# Patient Record
Sex: FEMALE | Race: BLACK OR AFRICAN AMERICAN | NOT HISPANIC OR LATINO
[De-identification: names, ages, dates, MRNs, and addresses within clinical notes are randomized per-mention and may not be internally consistent; named-entity substitution may affect disease eponyms.]

---

## 2020-03-16 ENCOUNTER — APPOINTMENT (OUTPATIENT)
Dept: ORTHOPEDIC SURGERY | Facility: CLINIC | Age: 40
End: 2020-03-16
Payer: COMMERCIAL

## 2020-03-16 VITALS
DIASTOLIC BLOOD PRESSURE: 85 MMHG | WEIGHT: 190 LBS | SYSTOLIC BLOOD PRESSURE: 123 MMHG | BODY MASS INDEX: 32.44 KG/M2 | HEART RATE: 85 BPM | HEIGHT: 64 IN

## 2020-03-16 DIAGNOSIS — Z60.2 PROBLEMS RELATED TO LIVING ALONE: ICD-10-CM

## 2020-03-16 DIAGNOSIS — Z78.9 OTHER SPECIFIED HEALTH STATUS: ICD-10-CM

## 2020-03-16 PROCEDURE — 73562 X-RAY EXAM OF KNEE 3: CPT | Mod: RT

## 2020-03-16 PROCEDURE — 99203 OFFICE O/P NEW LOW 30 MIN: CPT

## 2020-03-16 SDOH — SOCIAL STABILITY - SOCIAL INSECURITY: PROBLEMS RELATED TO LIVING ALONE: Z60.2

## 2020-03-16 NOTE — HISTORY OF PRESENT ILLNESS
[de-identified] : KYLE is a 39 year female who presents today with complaints of right knee pain.  patient states pain started started in Feb, no recalled injury. patient states 20 years ago had ACL reconstruction.  patient states positive swelling and stiffness to knee. patient states was seen at [facility] and colin had MRI, told to go to PT.  patient went to 2 sessions of PT so to this point. patient not taking any medication for pain

## 2020-03-16 NOTE — REVIEW OF SYSTEMS
[Joint Pain] : joint pain [Joint Stiffness] : joint stiffness [Joint Swelling] : joint swelling [FreeTextEntry9] : Right knee

## 2020-03-16 NOTE — DISCUSSION/SUMMARY
[de-identified] : Chelsy is a 39-year-old female status post ACL reconstruction 20 years ago.  Overall she is doing very well and is no instability of her knee her MRI shows no laxity of the ACL as well.  Her pain is secondary to patellofemoral pain syndrome IT band tendinitis and Pez anserine situs.  I have explained to the patient the anatomy of the patellofemoral joint. It includes the extensor mechanism (quadriceps tendon, quadriceps muscles, patella, patella tendon, and medial and lateral retinaculum). I have shown the patient that the articular cartilage gets a little softened. This is what is known as chondromalacia. If one theoretically were to remove or scrape all the articular cartilage, it would be identified as arthritis. Somewhat paradoxically, however, chondromalacia does not necessarily lead to arthritis or at least there is no evidence irrefutable at this point in time.\par \par Chondromalacia or anterior knee pain is a syndrome that hurts the patients more than it causes destruction to the knee; thus pain is not associated with destruction. Likewise, noises, cracking, and popping for the most part are not anymore significant than people cracking their knuckles. It is certainly not a sign of damage to the joint.\par Over 90% of the people with patellofemoral problems will get better if they understand the weight bearing stresses that are applied to the patellofemoral joint. The forces can range from 2 to 9 times your body weight per step and with abnormal alignment the average is usually higher. \par The treatment is to minimize weight-bearing stress and to strengthen the surrounding muscles. From a practical point of view, one can divide their lifestyle into activities of daily living, conditioning, and recreation. In activities of daily living one should feel free to walk around as necessary but only for functioning. If one has an option between stairs and an escalator, the latter is preferable.\par \par The conditioning aspect should be a non weight bearing program which is best achieved by bicycle riding at least 3 to 4 days a week. It should be done with increasing resistance for at least a half hour. The seat of the bike should always be kept at a position so that the knee stays within a 0 to 90 degree arc. This will avoid hyperextension and flexion beyond 90 degrees, which tends to aggravate symptoms of discomfort. Leg extension for stretching exercises are similarly kept within this arc of motion. Step machines are not advised as they tend to aggravate patellofemoral symptoms as do squats. A [employer] machine is an alternative that is usually acceptable.\par The recreational part of their life is based on the fact that since pain is not leading to destruction, we will compromise and allow a recreational lifestyle. If indeed activity, albeit associated with impact does not yield any symptoms, they should feel free to participate. If an increase in activities is associated with increasing discomfort, the patient should use "common sense" and cut back on the level of activity. Recreation, however, is never to be used for conditioning even in an asymptomatic patient. The patient must always maintain a conditioning program. I think the patient understands this explanation.\par \par While over 90% of the people with this syndrome will do well non-operatively, some conscientious patients will still have symptoms. If so, they should be reevaluated to ascertain if they fall into a small category of people who require physical therapy or surgery\par \par This time she elects for physical therapy and oral anti-inflammatories.  Meloxicam once daily for 21 days was given to her as well as a prescription for physical therapy.  I will see her back in 6 to 8 weeks for clinical reassessment.  She agrees with the above plan and all questions were answered.

## 2020-03-16 NOTE — PHYSICAL EXAM
[de-identified] : Physical Exam:\par General: Well appearing, no acute distress, A&O\par Neurologic: A&Ox3, No focal deficits\par Head: NCAT without abrasions, lacerations, or ecchymosis to head, face, or scalp\par Eyes: No scleral icterus, no gross abnormalities\par Respiratory: Equal chest wall expansion bilaterally, no accessory muscle use\par Lymphatic: No lymphadenopathy palpated\par Skin: Warm and dry\par Psychiatric: Normal mood and affect\par \par Right knee\par \par Inspection/Palpation: Gait evaluation does reveal a limp. There is no inguinal adenopathy. Limb is well-perfused, without skin lesions, shows a grossly normal motor and sensory examination.  Knee.  Positive grind test.  Negative patellar apprehension.  Positive patellar crepitus.\par ROM: The Right knee motion is significantly reduced and does cause significant pain. The knee exhibits a moderate effusion. \par Muscle/Nerves: Quad strength decreased secondary to injury. Motor exam 5/ distally, EHL/FHL/GSC/TA\par SILT L4-S1\par Special Tests: \par No joint line tenderness noted [medial/lateral] joint line at 0 and 90 degrees. \par Negative Michelle test.  Negative Appley grind test\par Stable in varus and valgus stress at 0 and 30 degrees\par Negative posterior drawer. \par The knee has a negative Lachman and negative anterior drawer.\par Normal hip and ankle exam. \par \par Left Knee\par \par Inspection/Palpation: There is no inguinal adenopathy. Well perfused, without skin lesions, shows a grossly normal motor and sensory examination. \par ROM: Normal\par Muscle/Nerves: Quad strength decreased secondary to injury. Motor exam 5/ distally, EHL/FHL/GSC/TA\par SILT L4-S1\par Special Tests: \par No Joint line tenderness noted  at medial and lateral joint line at 0 and 90 degrees. \par Stable in varus and valgus stress at 0 and 30 degrees\par Negative posterior drawer. \par Negative anterior drawer and stable Lachman \par Normal hip and ankle exam. [de-identified] : \par The following radiographs were ordered and read by me during this patients visit. I reviewed each radiograph in detail with the patient and discussed the findings as highlighted below. \par \par 4 views of the right knee were obtained today that show no fracture, dislocation. There is no degenerative change seen. There is no malalignment. No obvious osseous abnormality. Otherwise unremarkable.

## 2020-04-28 ENCOUNTER — APPOINTMENT (OUTPATIENT)
Dept: ORTHOPEDIC SURGERY | Facility: CLINIC | Age: 40
End: 2020-04-28
Payer: COMMERCIAL

## 2020-04-28 ENCOUNTER — TRANSCRIPTION ENCOUNTER (OUTPATIENT)
Age: 40
End: 2020-04-28

## 2020-04-28 DIAGNOSIS — Z87.39 PERSONAL HISTORY OF OTHER DISEASES OF THE MUSCULOSKELETAL SYSTEM AND CONNECTIVE TISSUE: ICD-10-CM

## 2020-04-28 DIAGNOSIS — M76.31 ILIOTIBIAL BAND SYNDROME, RIGHT LEG: ICD-10-CM

## 2020-04-28 DIAGNOSIS — S39.012A STRAIN OF MUSCLE, FASCIA AND TENDON OF LOWER BACK, INITIAL ENCOUNTER: ICD-10-CM

## 2020-04-28 PROCEDURE — 99214 OFFICE O/P EST MOD 30 MIN: CPT | Mod: 95

## 2020-04-28 NOTE — PHYSICAL EXAM
[de-identified] : Physical Exam:\par General: Well appearing, no acute distress, A&O\par Neurologic: A&Ox3, No focal deficits\par Head: NCAT without abrasions, lacerations, or ecchymosis to head, face, or scalp\par Eyes: No scleral icterus, no gross abnormalities\par Respiratory: Equal chest wall expansion bilaterally, no accessory muscle use\par Skin: no erythema\par Psychiatric: Normal mood and affect\par \par Lower back.  Patient places fingers over paraspinal muscles does any site of tenderness.  Patient able to flex forward without difficulty.  Patient has a normal gait with no Trendelenburg sign or antalgic gait.  Patient states subjectively she has no sensory deficits.  Patient denies weakness with hip flexion hip abduction hip extension, knee flexion knee extension, against the wall and isometric fashion.  This is subjective and relative to the other limb.  Mild swelling noted of the right knee.  Range of motion actively is 0 to 120 degrees.  Passively she is able to flex her knee to roughly 130 degrees.

## 2020-04-28 NOTE — REVIEW OF SYSTEMS
[Joint Stiffness] : joint stiffness [Joint Pain] : joint pain [Negative] : Endocrine [FreeTextEntry9] : Low back and right knee

## 2020-04-28 NOTE — DISCUSSION/SUMMARY
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  We discussed operative and nonoperative management.  We discussed the anatomy and the pathophysiology surrounding her pain.  At this time she will continue physical therapy as she is progressively improving with every session.  A new prescription was given to her.  I will see her back in 6 to 8 weeks for clinical reassessment.  She agrees with the above plan and all questions were answered.

## 2020-04-28 NOTE — HISTORY OF PRESENT ILLNESS
[Other Location: e.g. Home (Enter Location, City,State)___] : at [unfilled] [Home] : at home, [unfilled] , at the time of the visit. [Self] : self [Patient] : the patient [FreeTextEntry4] : Shad martin [de-identified] : A telehealth visit was performed today with Chelsy.  This is a follow-up for her right knee pain due to patellar chondromalacia and IT band tendinitis.  She feels 4050% improved since the visit.  She denies any stiffness in her knee at this time.  She denies instability.  She is doing physical therapy and tolerating tolerating it well.  She is showing gradual improvement every day with physical therapy.  She is taking the meloxicam as needed.  She is complaining of some low back pain that now occurred.  Pain is consistent.  Pain is worse with activities.  Pain does not radiate.  She has no neurologic symptoms are radicular symptoms down her legs. [Worsening] : worsening [3] : a current pain level of 3/10 [___ wks] : [unfilled] week(s) ago [6] : a maximum pain level of 6/10 [1] : a minimum pain level of 1/10 [2] : an average pain level of 2/10 [Lifting] : lifting [Standing] : standing

## 2023-04-05 ENCOUNTER — APPOINTMENT (OUTPATIENT)
Dept: ORTHOPEDIC SURGERY | Facility: CLINIC | Age: 43
End: 2023-04-05
Payer: COMMERCIAL

## 2023-04-05 DIAGNOSIS — M17.11 UNILATERAL PRIMARY OSTEOARTHRITIS, RIGHT KNEE: ICD-10-CM

## 2023-04-05 PROCEDURE — 73564 X-RAY EXAM KNEE 4 OR MORE: CPT | Mod: 26,RT

## 2023-04-05 PROCEDURE — 99214 OFFICE O/P EST MOD 30 MIN: CPT

## 2023-04-05 NOTE — PHYSICAL EXAM
[de-identified] :  Multi-System Physical Exam\par \par General: Well appearing, no acute distress \par Neurologic: A&Ox3, No focal deficits \par Head: NCAT without abrasions, lacerations, or ecchymosis to head, face, or scalp \par Eyes: No scleral icterus, no gross abnormalities \par Respiratory: Equal chest wall expansion bilaterally, no accessory muscle use \par Lymphatic: No lymphadenopathy palpated \par Skin: Warm and dry \par Psychiatric: Normal mood and affect\par \par Right knee is examined reveals no obvious deformity swelling or ecchymosis.  There is no effusion.  She has tenderness in the medial lateral patellar facets.  Range of motion 0 to 130 degrees, no pain with Michelle or grind testing.  She is stable to Lachman testing as well as valgus and varus stress.  She has good strength and straight leg raising.  Her calf and thigh are soft and nontender.  She is grossly neurovascular intact distally. [de-identified] : 4 views of R knee were performed today and available for me to review. Results were discussed with the patient. They demonstrate no f/x, dislocation or other deformity. There is mild patellofemoral arthritis appreciated.  Slight dysplasia of the trochlea noted.\par

## 2023-04-05 NOTE — REVIEW OF SYSTEMS
[Arthralgia] : arthralgia [Joint Pain] : joint pain [Joint Swelling] : joint swelling [Negative] : Heme/Lymph [FreeTextEntry9] : R knee

## 2023-04-05 NOTE — HISTORY OF PRESENT ILLNESS
[Stable] : stable [___ wks] : [unfilled] week(s) ago [3] : an average pain level of 3/10 [2] : a minimum pain level of 2/10 [4] : a maximum pain level of 4/10 [Bending] : worsened by bending [Walking] : worsened by walking [Knee Extension] : worsened with knee extension [de-identified] : KYLE LUJAN is a 42 year female being seen for f/u visit R knee pain.  Works for the [employer]. she reports she went bowling 2 weeks ago, and after activity her knee swelled up on her. She has been experiencing pain with walking, stairs and getting up after long periods of sitting. Currently, she reports the swelling in her knee has improved, but her pain has not. She endorses pain when trying to fully extend her knee. She reports no new injuries to R knee. Patient denies numbness and tingling to the extremities. She denies use of OTC pain medications. She reports prior h/x of R knee ACL reconstruction 20 years ago.  She localized the pain around the kneecap.  She denies buckling or instability.

## 2023-04-05 NOTE — DISCUSSION/SUMMARY
[de-identified] : I had a lengthy discussion with the patient regarding their current condition. We discussed the treatment options including operative and nonoperative management. At this time I recommended conservative management.  I offered her supportive knee brace but she would like to obtain an over-the-counter sleeve.  She is referred to physical therapy.  She is given a prescription for Celebrex, GI precautions were reviewed.  We discussed HA injections and cortisone injection in the future as well as MRI and surgical intervention.  She would like to avoid this if possible.  She will follow-up in 4 to 6 weeks if still symptomatic otherwise as needed.  All questions were answered.

## 2023-05-04 ENCOUNTER — RX RENEWAL (OUTPATIENT)
Age: 43
End: 2023-05-04

## 2023-05-04 RX ORDER — CELECOXIB 200 MG/1
200 CAPSULE ORAL
Qty: 30 | Refills: 0 | Status: ACTIVE | COMMUNITY
Start: 2023-04-05 | End: 1900-01-01

## 2025-01-20 ENCOUNTER — APPOINTMENT (OUTPATIENT)
Dept: ORTHOPEDIC SURGERY | Facility: CLINIC | Age: 45
End: 2025-01-20
Payer: COMMERCIAL

## 2025-01-20 DIAGNOSIS — G25.89 OTHER SPECIFIED EXTRAPYRAMIDAL AND MOVEMENT DISORDERS: ICD-10-CM

## 2025-01-20 DIAGNOSIS — S46.811A STRAIN OF OTHER MUSCLES, FASCIA AND TENDONS AT SHOULDER AND UPPER ARM LEVEL, RIGHT ARM, INITIAL ENCOUNTER: ICD-10-CM

## 2025-01-20 PROCEDURE — 73030 X-RAY EXAM OF SHOULDER: CPT | Mod: 26,RT

## 2025-01-20 PROCEDURE — 99214 OFFICE O/P EST MOD 30 MIN: CPT

## 2025-01-20 RX ORDER — CELECOXIB 200 MG/1
200 CAPSULE ORAL
Qty: 28 | Refills: 0 | Status: ACTIVE | COMMUNITY
Start: 2025-01-20 | End: 1900-01-01

## 2025-01-20 RX ORDER — CYCLOBENZAPRINE HYDROCHLORIDE 10 MG/1
10 TABLET, FILM COATED ORAL 3 TIMES DAILY
Qty: 30 | Refills: 0 | Status: ACTIVE | COMMUNITY
Start: 2025-01-20 | End: 1900-01-01

## 2025-02-14 ENCOUNTER — RX RENEWAL (OUTPATIENT)
Age: 45
End: 2025-02-14

## 2025-03-14 ENCOUNTER — RX RENEWAL (OUTPATIENT)
Age: 45
End: 2025-03-14